# Patient Record
Sex: FEMALE | Race: WHITE | NOT HISPANIC OR LATINO | URBAN - METROPOLITAN AREA
[De-identification: names, ages, dates, MRNs, and addresses within clinical notes are randomized per-mention and may not be internally consistent; named-entity substitution may affect disease eponyms.]

---

## 2019-10-30 ENCOUNTER — OFFICE VISIT CONVERTED (OUTPATIENT)
Dept: NEUROSURGERY | Facility: CLINIC | Age: 54
End: 2019-10-30
Attending: PHYSICIAN ASSISTANT

## 2021-05-15 VITALS
HEIGHT: 68 IN | SYSTOLIC BLOOD PRESSURE: 145 MMHG | DIASTOLIC BLOOD PRESSURE: 89 MMHG | BODY MASS INDEX: 37.44 KG/M2 | WEIGHT: 247.06 LBS

## 2024-07-14 ENCOUNTER — HOSPITAL ENCOUNTER (OUTPATIENT)
Dept: OTHER | Facility: HOSPITAL | Age: 59
Discharge: HOME | End: 2024-07-14

## 2024-08-15 ENCOUNTER — ANCILLARY ORDERS (OUTPATIENT)
Dept: NEUROSURGERY | Facility: CLINIC | Age: 59
End: 2024-08-15
Payer: COMMERCIAL

## 2024-08-21 ENCOUNTER — OFFICE VISIT (OUTPATIENT)
Dept: NEUROSURGERY | Facility: CLINIC | Age: 59
End: 2024-08-21
Payer: COMMERCIAL

## 2024-08-21 VITALS
WEIGHT: 218 LBS | BODY MASS INDEX: 33.04 KG/M2 | HEART RATE: 71 BPM | SYSTOLIC BLOOD PRESSURE: 132 MMHG | HEIGHT: 68 IN | DIASTOLIC BLOOD PRESSURE: 66 MMHG

## 2024-08-21 DIAGNOSIS — M48.062 SPINAL STENOSIS, LUMBAR REGION, WITH NEUROGENIC CLAUDICATION: Primary | ICD-10-CM

## 2024-08-21 DIAGNOSIS — M47.27 OSTEOARTHRITIS OF SPINE WITH RADICULOPATHY, LUMBOSACRAL REGION: ICD-10-CM

## 2024-08-21 PROCEDURE — 99203 OFFICE O/P NEW LOW 30 MIN: CPT | Performed by: NURSE PRACTITIONER

## 2024-08-21 RX ORDER — LEVALBUTEROL TARTRATE 45 UG/1
AEROSOL, METERED ORAL
COMMUNITY
Start: 2024-07-09

## 2024-08-21 RX ORDER — OMEPRAZOLE 40 MG/1
CAPSULE, DELAYED RELEASE ORAL
COMMUNITY

## 2024-08-21 RX ORDER — TRIAMCINOLONE ACETONIDE 55 UG/1
SPRAY, METERED NASAL
COMMUNITY

## 2024-08-21 RX ORDER — MELOXICAM 15 MG/1
15 TABLET ORAL DAILY
COMMUNITY

## 2024-08-21 RX ORDER — HYDROCHLOROTHIAZIDE 12.5 MG/1
12.5 CAPSULE, GELATIN COATED ORAL DAILY
COMMUNITY

## 2024-08-21 RX ORDER — SIMVASTATIN 40 MG
1 TABLET ORAL
COMMUNITY
Start: 2024-06-11

## 2024-08-21 RX ORDER — LOSARTAN POTASSIUM 25 MG/1
TABLET ORAL
COMMUNITY

## 2024-08-21 RX ORDER — LEVOTHYROXINE SODIUM 0.07 MG/1
75 TABLET ORAL EVERY MORNING
COMMUNITY

## 2024-08-21 RX ORDER — MONTELUKAST SODIUM 10 MG/1
1 TABLET ORAL
COMMUNITY

## 2024-08-21 RX ORDER — BUPROPION HYDROCHLORIDE 300 MG/1
1 TABLET ORAL DAILY
COMMUNITY

## 2024-08-21 RX ORDER — ESTRADIOL 2 MG/1
2 TABLET ORAL DAILY
COMMUNITY

## 2024-08-21 NOTE — PROGRESS NOTES
"Chief Complaint  Establish Care and Back Pain (Spinal stenosis, lumbar region without neurogenic claudication - MRI LUMBAR WO 07/14/24 [facility] *)    Subjective          Adriana Coronel who is a 59 y.o. year old female who presents to [facility] for evaluation of lumbar spine.      The patient complains of pain located in the lumbar spine.  Patients states the pain has been present for several years.  The pain came on gradually.  Pt was evaluated in our office in 2019 and there was discussion of surgery at that time. Unfortunately the pandemic hit and she has been managing her symptoms on her own. She worked with physical therapy, which did not provide her much benefit. When they started doing traction she started developing numbness into the legs. The pain scale level is severe.  The pain does radiate. Dermatomes are located bilaterally Lumbar at: L4..  The pain is Intermittent and waxing/waning and described as sharp and aching.  The pain is worse at no particular time of day. Patient states prolonged standing, prolonged sitting, and prolonged walking makes the pain worse.  Patient states rest makes the pain better.    Associated Symptoms Include: Numbness, Tingling, and Weakness  Conservative Interventions Include: Physical Therapy that was not very effective. and NSAIDs that were somewhat effective.    Was this the result of an injury or accident? : No    History of Previous Spinal Surgery?: No    Nicotine use: non-smoker    BMI: Body mass index is 33.15 kg/m².      Review of Systems   Musculoskeletal:  Positive for arthralgias, back pain and myalgias.   All other systems reviewed and are negative.       Objective   Vital Signs:   /66 (BP Location: Left arm, Patient Position: Sitting, Cuff Size: Large Adult)   Pulse 71   Ht 172.7 cm (68\")   Wt 98.9 kg (218 lb)   BMI 33.15 kg/m²       Physical Exam  Vitals reviewed.   Constitutional:       " Appearance: Normal appearance.   Musculoskeletal:      Thoracic back: Tenderness present.      Lumbar back: Tenderness present. Negative right straight leg raise test and negative left straight leg raise test.      Right hip: No tenderness. Normal range of motion.      Left hip: No tenderness. Normal range of motion.   Neurological:      Mental Status: She is alert and oriented to person, place, and time.      Motor: Motor strength is normal.     Gait: Gait is intact.      Deep Tendon Reflexes:      Reflex Scores:       Patellar reflexes are 2+ on the right side and 2+ on the left side.       Achilles reflexes are 2+ on the right side and 2+ on the left side.       Neurologic Exam     Mental Status   Oriented to person, place, and time.   Level of consciousness: alert    Motor Exam   Muscle bulk: normal  Overall muscle tone: normal    Strength   Strength 5/5 throughout.     Sensory Exam   Sensory deficit distribution on right: L4  Sensory deficit distribution on left: L4    Gait, Coordination, and Reflexes     Gait  Gait: normal and wide-based    Reflexes   Right patellar: 2+  Left patellar: 2+  Right achilles: 2+  Left achilles: 2+  Right ankle clonus: absent  Left ankle clonus: absent       Result Review :       Data reviewed : Radiologic studies MRI Lumbar Spine on 8/15/24 at [facility] personally reviewed and interpreted. Transitional vertebra. Advanced multilevel spondylosis, resulting in multilevel spinal canal and foraminal stenosis. At L3/4 there is degenerative grade 1 anterolisthesis, with a disc bulge combined with severe facet arthropathy resulting in severe spinal canal and bilateral foraminal stenosis. This is the most notable finding.            Assessment and Plan    Diagnoses and all orders for this visit:    1. Spinal stenosis, lumbar region, with neurogenic claudication (Primary)    2. Osteoarthritis of spine with radiculopathy, lumbosacral region    Pt presenting for chronic low back pain  radiating into bilateral lower extremities, right greater than left. We reviewed her MRI Lumbar Spine, demonstrating advanced spondylosis with severe spinal stenosis at L3/4, moderately severe spinal stenosis at L2/3. She is demonstrating symptoms of neurogenic claudication. Will scheduled surgical consult with Dr. Muniz to evaluate for lumbar laminectomy. Education information provided.         Follow Up   Return in about 2 days (around 8/23/2024).  Patient was given instructions and counseling regarding her condition or for health maintenance advice.

## 2024-08-23 ENCOUNTER — OFFICE VISIT (OUTPATIENT)
Dept: NEUROSURGERY | Facility: CLINIC | Age: 59
End: 2024-08-23
Payer: COMMERCIAL

## 2024-08-23 ENCOUNTER — PATIENT ROUNDING (BHMG ONLY) (OUTPATIENT)
Dept: NEUROSURGERY | Facility: CLINIC | Age: 59
End: 2024-08-23
Payer: COMMERCIAL

## 2024-08-23 VITALS
HEIGHT: 68 IN | BODY MASS INDEX: 33.21 KG/M2 | WEIGHT: 219.1 LBS | SYSTOLIC BLOOD PRESSURE: 130 MMHG | DIASTOLIC BLOOD PRESSURE: 85 MMHG

## 2024-08-23 DIAGNOSIS — M48.062 SPINAL STENOSIS, LUMBAR REGION, WITH NEUROGENIC CLAUDICATION: Primary | ICD-10-CM

## 2024-08-23 PROCEDURE — 99214 OFFICE O/P EST MOD 30 MIN: CPT | Performed by: NEUROLOGICAL SURGERY

## 2024-08-23 NOTE — PROGRESS NOTES
Adriana Coronel is a 59 y.o. female that presents with Back Pain       Back Pain  Associated symptoms include numbness.   She used to get relief with sitting. She has thoracic pain also. She was supposed to have surgery in 2019, but Covid hit and didn't pursue.     Review of Systems   Musculoskeletal:  Positive for back pain and myalgias.   Neurological:  Positive for numbness.        Vitals:    08/23/24 1129   BP: 130/85        Physical Exam  Constitutional:       Comments: BMI 33   Cardiovascular:      Comments: No notable edema   Pulmonary:      Effort: Pulmonary effort is normal.   Neurological:      Mental Status: She is alert.   Psychiatric:         Mood and Affect: Mood normal.        I personally interpreted the patient's MRI scan which shows multilevel DDD with severe stenosis at L3-4 and moderate stenosis at L2-3.       Assessment and Plan {CC Problem List  Visit Diagnosis  ROS  Review (Popup)  Health Maintenance  Quality  BestPractice  Medications  SmartSets  SnapShot Encounters  Media :23}   Problem List Items Addressed This Visit    None  Visit Diagnoses       Spinal stenosis, lumbar region, with neurogenic claudication    -  Primary    Relevant Orders    Case Request (Completed)          She would like to pursue laminectomy in hopes to improve the leg symptoms.     We discussed the risks and benefits of surgery.       Follow Up {Instructions Charge Capture  Follow-up Communications :23}   No follow-ups on file.

## 2024-09-09 ENCOUNTER — TELEPHONE (OUTPATIENT)
Dept: NEUROSURGERY | Facility: CLINIC | Age: 59
End: 2024-09-09
Payer: COMMERCIAL

## 2024-09-09 NOTE — TELEPHONE ENCOUNTER
Pt called in and advised she will need to cancel her appt due to her having cellulitis on her elbow and still having antibiotics.    Pt would like a call back to discuss when surgery can be rescheduled and restrictions due to her having cellulitis and several antibiotics